# Patient Record
Sex: FEMALE | Race: WHITE | NOT HISPANIC OR LATINO | URBAN - METROPOLITAN AREA
[De-identification: names, ages, dates, MRNs, and addresses within clinical notes are randomized per-mention and may not be internally consistent; named-entity substitution may affect disease eponyms.]

---

## 2024-11-22 ENCOUNTER — EMERGENCY (EMERGENCY)
Facility: HOSPITAL | Age: 29
LOS: 1 days | Discharge: HOME | End: 2024-11-22
Attending: EMERGENCY MEDICINE | Admitting: EMERGENCY MEDICINE
Payer: MEDICAID

## 2024-11-22 VITALS
SYSTOLIC BLOOD PRESSURE: 124 MMHG | TEMPERATURE: 98 F | HEART RATE: 88 BPM | OXYGEN SATURATION: 99 % | RESPIRATION RATE: 19 BRPM | DIASTOLIC BLOOD PRESSURE: 78 MMHG

## 2024-11-22 VITALS
OXYGEN SATURATION: 98 % | SYSTOLIC BLOOD PRESSURE: 123 MMHG | RESPIRATION RATE: 18 BRPM | TEMPERATURE: 99 F | DIASTOLIC BLOOD PRESSURE: 83 MMHG | WEIGHT: 89.95 LBS | HEIGHT: 59 IN | HEART RATE: 92 BPM

## 2024-11-22 LAB
ALBUMIN SERPL ELPH-MCNC: 3.7 G/DL — SIGNIFICANT CHANGE UP (ref 3.4–5)
ALP SERPL-CCNC: 49 U/L — SIGNIFICANT CHANGE UP (ref 40–120)
ALT FLD-CCNC: 15 U/L — SIGNIFICANT CHANGE UP (ref 12–42)
ANION GAP SERPL CALC-SCNC: 8 MMOL/L — LOW (ref 9–16)
AST SERPL-CCNC: 21 U/L — SIGNIFICANT CHANGE UP (ref 15–37)
BASOPHILS # BLD AUTO: 0.05 K/UL — SIGNIFICANT CHANGE UP (ref 0–0.2)
BASOPHILS NFR BLD AUTO: 0.8 % — SIGNIFICANT CHANGE UP (ref 0–2)
BILIRUB SERPL-MCNC: 0.3 MG/DL — SIGNIFICANT CHANGE UP (ref 0.2–1.2)
BUN SERPL-MCNC: 18 MG/DL — SIGNIFICANT CHANGE UP (ref 7–23)
CALCIUM SERPL-MCNC: 8.8 MG/DL — SIGNIFICANT CHANGE UP (ref 8.5–10.5)
CHLORIDE SERPL-SCNC: 106 MMOL/L — SIGNIFICANT CHANGE UP (ref 96–108)
CO2 SERPL-SCNC: 26 MMOL/L — SIGNIFICANT CHANGE UP (ref 22–31)
CREAT SERPL-MCNC: 0.76 MG/DL — SIGNIFICANT CHANGE UP (ref 0.5–1.3)
EGFR: 109 ML/MIN/1.73M2 — SIGNIFICANT CHANGE UP
EOSINOPHIL # BLD AUTO: 0.12 K/UL — SIGNIFICANT CHANGE UP (ref 0–0.5)
EOSINOPHIL NFR BLD AUTO: 1.9 % — SIGNIFICANT CHANGE UP (ref 0–6)
GLUCOSE SERPL-MCNC: 79 MG/DL — SIGNIFICANT CHANGE UP (ref 70–99)
HCG SERPL-ACNC: 2 MIU/ML — SIGNIFICANT CHANGE UP
HCT VFR BLD CALC: 37.6 % — SIGNIFICANT CHANGE UP (ref 34.5–45)
HGB BLD-MCNC: 13.3 G/DL — SIGNIFICANT CHANGE UP (ref 11.5–15.5)
IMM GRANULOCYTES NFR BLD AUTO: 0 % — SIGNIFICANT CHANGE UP (ref 0–0.9)
LYMPHOCYTES # BLD AUTO: 2.16 K/UL — SIGNIFICANT CHANGE UP (ref 1–3.3)
LYMPHOCYTES # BLD AUTO: 33.6 % — SIGNIFICANT CHANGE UP (ref 13–44)
MAGNESIUM SERPL-MCNC: 1.9 MG/DL — SIGNIFICANT CHANGE UP (ref 1.6–2.6)
MCHC RBC-ENTMCNC: 34.2 PG — HIGH (ref 27–34)
MCHC RBC-ENTMCNC: 35.4 G/DL — SIGNIFICANT CHANGE UP (ref 32–36)
MCV RBC AUTO: 96.7 FL — SIGNIFICANT CHANGE UP (ref 80–100)
MONOCYTES # BLD AUTO: 0.47 K/UL — SIGNIFICANT CHANGE UP (ref 0–0.9)
MONOCYTES NFR BLD AUTO: 7.3 % — SIGNIFICANT CHANGE UP (ref 2–14)
NEUTROPHILS # BLD AUTO: 3.62 K/UL — SIGNIFICANT CHANGE UP (ref 1.8–7.4)
NEUTROPHILS NFR BLD AUTO: 56.4 % — SIGNIFICANT CHANGE UP (ref 43–77)
NRBC # BLD: 0 /100 WBCS — SIGNIFICANT CHANGE UP (ref 0–0)
PLATELET # BLD AUTO: 237 K/UL — SIGNIFICANT CHANGE UP (ref 150–400)
POTASSIUM SERPL-MCNC: 3.9 MMOL/L — SIGNIFICANT CHANGE UP (ref 3.5–5.3)
POTASSIUM SERPL-SCNC: 3.9 MMOL/L — SIGNIFICANT CHANGE UP (ref 3.5–5.3)
PROT SERPL-MCNC: 6.7 G/DL — SIGNIFICANT CHANGE UP (ref 6.4–8.2)
RBC # BLD: 3.89 M/UL — SIGNIFICANT CHANGE UP (ref 3.8–5.2)
RBC # FLD: 11.9 % — SIGNIFICANT CHANGE UP (ref 10.3–14.5)
SODIUM SERPL-SCNC: 140 MMOL/L — SIGNIFICANT CHANGE UP (ref 132–145)
TROPONIN I, HIGH SENSITIVITY RESULT: 4.7 NG/L — SIGNIFICANT CHANGE UP
TSH SERPL-MCNC: 2.64 UIU/ML — SIGNIFICANT CHANGE UP (ref 0.36–3.74)
WBC # BLD: 6.42 K/UL — SIGNIFICANT CHANGE UP (ref 3.8–10.5)
WBC # FLD AUTO: 6.42 K/UL — SIGNIFICANT CHANGE UP (ref 3.8–10.5)

## 2024-11-22 PROCEDURE — 99285 EMERGENCY DEPT VISIT HI MDM: CPT

## 2024-11-22 NOTE — ED ADULT TRIAGE NOTE - PAIN: PRESENCE, MLM
NOLANETS:  [facility]  A collaboration between [facility] and [facility]    PATIENT: Nan Palafox  MRN: [id number]  DATE: 12/2/2020      Diagnosis:   1. Malignant carcinoid tumor of bronchus and lung    2. Abnormal thyroid scan    3. History of gastrointestinal stromal tumor (GIST)        Chief Complaint: Carcinoid Tumor      Oncologic History:      Oncologic History Pulmonary carcinoid tumor, right, diagnosed 11/2020    Oncologic Treatment     Pathology 11/25/2020-CT lung biopsy:  Low-grade carcinoid tumor, Ki-67 <2%          Subjective:    Interval History: Ms. Palafox is a 72 y.o. female who is seen as an initial visit for a pulmonary carcinoid tumor.  Her history dates to 11/2020 when she was undergoing a CT for surveillance of a pulmonary nodule that was noted on CTs going back to 2018.  This particular nodule had increased in size to 1.5 cm from 1 cm and a biopsy was recommended.  She underwent a CT-guided biopsy on 11/25/2020 with pathology showing a low-grade carcinoid tumor with Ki-67 < 2%.  She did have complications following her biopsy including a possible seizure and required an ICU stay and rehab.  She has since recovered from her symptoms.  Gallium 68 PET-CT in 12/2020 showed no evidence uptake in the right middle lobe nodule, however, she did have some uptake near the left thyroid.    She is seen today with her daughter.  She states that she generally has been feeling well.  She has a chronic cough for many years but has never had hemoptysis.  She denies any shortness of breath or wheeze.  Her prior neurologic symptoms have completely resolved.  She is without other new complaints.    Past Medical History:   Past Medical History:   Diagnosis Date    Arthritis     Cataract     Cervical spondylosis with radiculopathy 4/11/2016    History of gastrointestinal stromal tumor (GIST) 12/2/2020     Hypertension     Malignant carcinoid tumor of bronchus and lung 12/2/2020    Nuclear sclerosis - Both Eyes 2/8/2013    PNA (pneumonia)     Primary open angle glaucoma (POAG) of both eyes, moderate stage 3/11/2019       Past Surgical HIstory:   Past Surgical History:   Procedure Laterality Date    BREAST BIOPSY Right     excisional bx    BREAST SURGERY Right     Excisional bx    RETINAL DETACHMENT SURGERY      patient states that she had a retinal tear that was repaired in the past at [facility] but she is uncertain of which eye    STOMACH SURGERY      TOTAL KNEE ARTHROPLASTY Right        Family History:   Family History   Problem Relation Age of Onset    Glaucoma Mother     Cataracts Mother     Blindness Mother     Cancer Mother 68        colon, myeloma    Glaucoma Brother     Cataracts Brother     Cancer Brother         esophageal    No Known Problems Father     No Known Problems Sister     No Known Problems Maternal Aunt     No Known Problems Maternal Uncle     No Known Problems Paternal Aunt     No Known Problems Paternal Uncle     No Known Problems Maternal Grandmother     No Known Problems Maternal Grandfather     No Known Problems Paternal Grandmother     No Known Problems Paternal Grandfather     Amblyopia Neg Hx     Macular degeneration Neg Hx     Retinal detachment Neg Hx     Strabismus Neg Hx     Diabetes Neg Hx     Hypertension Neg Hx     Stroke Neg Hx     Thyroid disease Neg Hx     Breast cancer Neg Hx     Ovarian cancer Neg Hx        Social History:  reports that she has never smoked. She has never used smokeless tobacco. She reports current alcohol use of about 3.0 standard drinks of alcohol per week. She reports that she does not use drugs.    Allergies:  Review of patient's allergies indicates:   Allergen Reactions    Iodine Hives       Medications:  Current Outpatient Medications   Medication Sig Dispense Refill    albuterol (VENTOLIN HFA) 90 mcg/actuation inhaler  Inhale 2 puffs into the lungs every 6 (six) hours as needed for Wheezing or Shortness of Breath. Rescue 1 Inhaler 0    amLODIPine (NORVASC) 10 MG tablet Take 1 tablet (10 mg total) by mouth once daily. For blood pressure control. 90 tablet 3    ASCORBATE CALCIUM (VITAMIN C ORAL) Take by mouth.      aspirin (ECOTRIN) 81 MG EC tablet Take 81 mg by mouth once daily.      atorvastatin (LIPITOR) 20 MG tablet Take 1 tablet (20 mg total) by mouth once daily. For cholesterol control 30 tablet 6    dorzolamide-timolol 2-0.5% (COSOPT) 22.3-6.8 mg/mL ophthalmic solution Place 1 drop into both eyes 2 (two) times daily. 10 mL 12    fluticasone propionate (FLONASE) 50 mcg/actuation nasal spray SHAKE LIQUID AND USE 2 SPRAYS(100 MCG) IN EACH NOSTRIL EVERY DAY 48 g 0    hydrOXYzine pamoate (VISTARIL) 25 MG Cap TK ONE C PO Q 8 H PRA      latanoprost 0.005 % ophthalmic solution Place 1 drop into both eyes every evening. 2.5 mL 12    LORazepam (ATIVAN) 1 MG tablet TAKE 1 TABLET BY MOUTH EVERY 8 HOURS AS NEEDED FOR ANXIETY 60 tablet 0    melatonin (MELATIN) Take 5 mg by mouth.      multivitamin-iron-folic acid (CENTRUM ULTRA WOMEN'S)  mg-mcg Tab Take by mouth.      pantoprazole (PROTONIX) 40 MG tablet TK 1 T PO QAM  2    rosuvastatin (CRESTOR) 20 MG tablet TK 1 T PO QD  4    tiZANidine (ZANAFLEX) 4 MG tablet       valsartan-hydrochlorothiazide (DIOVAN-HCT) 320-25 mg per tablet TK 1 T PO QAM  3    vitamin D 1000 units Tab Take 1,000 Units by mouth.      azithromycin (Z-CRISS) 250 MG tablet       fluticasone (FLONASE) 50 mcg/actuation nasal spray 2 sprays by Each Nare route once daily. (Patient not taking: Reported on 11/17/2020) 1 Bottle 12    gabapentin (NEURONTIN) 300 MG capsule       levETIRAcetam (KEPPRA) 500 MG Tab TK 1 T PO BID      methylPREDNISolone (MEDROL DOSEPACK) 4 mg tablet       promethazine-dextromethorphan (PROMETHAZINE-DM) 6.25-15 mg/5 mL Syrp TK 1 TO 2 TEA PO Q 6 H PRF COUGH       No current  "facility-administered medications for this visit.        Review of Systems   Constitutional: Negative for chills, fever and unexpected weight change.   HENT: Negative for congestion, hearing loss and nosebleeds.    Eyes: Negative for visual disturbance.   Respiratory: Negative for cough and shortness of breath.    Cardiovascular: Negative for chest pain and palpitations.   Gastrointestinal: Negative for abdominal pain, blood in stool, constipation, diarrhea, nausea and vomiting.   Genitourinary: Negative for dysuria.   Musculoskeletal: Negative for back pain and gait problem.   Skin: Negative for color change and rash.   Neurological: Negative for dizziness, weakness and headaches.   Hematological: Negative for adenopathy. Does not bruise/bleed easily.   Psychiatric/Behavioral: Negative for confusion.       ECOG Performance Status: 0   Objective:      Vitals:   Vitals:    12/02/20 0810   BP: 130/79   BP Location: Right arm   Patient Position: Sitting   BP Method: Medium (Automatic)   Pulse: 81   Temp: 98.2 °F (36.8 °C)   TempSrc: Oral   SpO2: 99%   Weight: 87.1 kg (192 lb 0.3 oz)   Height: 5' 5" (1.651 m)     BMI: Body mass index is 31.95 kg/m².    Physical Exam  Constitutional:       General: She is not in acute distress.     Appearance: She is well-developed.   HENT:      Head: Normocephalic.      Mouth/Throat:      Pharynx: No oropharyngeal exudate.   Eyes:      General: No scleral icterus.  Neck:      Musculoskeletal: Neck supple.      Thyroid: No thyromegaly.      Trachea: No tracheal deviation.   Cardiovascular:      Rate and Rhythm: Normal rate and regular rhythm.   Pulmonary:      Effort: Pulmonary effort is normal. No respiratory distress.      Breath sounds: Normal breath sounds. No wheezing or rales.   Abdominal:      General: There is no distension.      Palpations: Abdomen is soft. There is no mass.      Tenderness: There is no abdominal tenderness. There is no guarding or rebound.   Musculoskeletal: " Normal range of motion.   Lymphadenopathy:      Cervical: No cervical adenopathy.   Skin:     General: Skin is warm and dry.   Neurological:      Mental Status: She is alert and oriented to person, place, and time.      Cranial Nerves: No cranial nerve deficit.         Laboratory Data:  Office Visit on 11/17/2020   Component Date Value Ref Range Status    POC Rapid COVID 11/17/2020 Negative  Negative Final     Acceptable 11/17/2020 Yes   Final     11/27/2020 TC/kp                                  In addition to the stains previously reported, a neuron specific   enolase stain is received which is positive, further evidence   confirming carcinoid tumor. Control tissue stains appropriately.                                  Addendum Report Verified by Daniel Day MD on 11/27/2020 12:39 PM                                  [facility], [facility] * [address] [place] [address] * [place]   [address]                                                                THE ORIGINAL REPORT BELOW WAS VERIFIED BY Daniel Day MD ON   11/25/2020 12:27 PM                                                                  DIAGNOSIS:  11/25/2020      TC/rkb/puma                                  LUNG, RIGHT UPPER LOBE, BIOPSY:   - LOW GRADE CARCINOID TUMOR.     - SEE COMMENT.                                  Comment: Immunoperoxidase stains are performed for confirmation.   Neuroendocrine origin of this tumor is substantiated with strong   staining for CD56 and synaptophysin, although chromogranin is   negative. Cells are weakly positive for TTF1, helping to confirm a   pulmonary origin of this tumor. Ki-67 proliferative index is less than   2%, confirming a low grade proliferative index. The overall   immunohistochemical features confirm a diagnosis of low grade   carcinoid tumor. Since there can be some morphological overlap between   metastatic breast cancer and neuroendocrine tumors, estrogen receptor,    mammoglobin, and GATA3 stains are performed. All three of these stains   are negative, helping to exclude a metastatic mammary carcinoma.                                      Imaging:   Gallium 68 PET-CT 12/01/2020  COMPARISON:  None     FINDINGS:  Quality of the study: Misregistration artifact within the head and neck.     Focal uptake within the region of the posterior left thyroid lobe with maximum SUV of 7.1.  CT demonstrates a correlating 1.0 cm hypodense nodule in or deep to the left lobe.     1.2 x 1.3 cm right middle lobe pulmonary nodule (series 2, image 118) without significant radiotracer uptake.  6 mm pulmonary nodule within the right middle lobe, below the size threshold for PET.     Physiologic distribution of the tracer is seen within the pituitary, salivary, stomach, liver, pancreas uncinate process, spleen, adrenal glands,  tract, and bowel.     Impression:     Right middle lobe pulmonary nodule without significant somatostatin receptor avidity.  Note is made of prior outside hospital FNA with pathology consistent with low-grade carcinoid tumor.  Further evaluation with FDG PET-CT may be considered.     6 mm right middle pulmonary nodule below the size threshold for PET.  Attention on follow-up.     Incidental somatostatin receptor avid hypodense nodule within the region of the posterior left thyroid lobe.  Differential considerations include thyroid or parathyroid neuroendocrine tumor. Recommend further evaluation with thyroid ultrasound and FNA.     This report was flagged in Epic as abnormal.            Assessment:       1. Malignant carcinoid tumor of bronchus and lung    2. Abnormal thyroid scan    3. History of gastrointestinal stromal tumor (GIST)           Plan:     I have had a long conversation with Mrs. Palafox and her daughter today concerning her disease.  She has a carcinoid tumor of the right lung which was diagnosed last month after a CT-guided biopsy of a pulmonary nodule.   Review of her CT from last month and also her gallium 68 PET-CT demonstrates no evidence metastatic disease.  We have discussed the natural history of carcinoid tumors and have noted that this pulmonary nodule dates back to CTs done in 2018 indicating a slow-growing malignancy.  I have recommended she see thoracic surgery for consideration of surgical resection.  We have also discussed the possibility stereotactic body radiotherapy for this but she wishes to hold off on any radiation.  She understands she will likely not need any chemotherapy or radiation following this but will need to review pathology after surgical resection.  It is noted that she had mild uptake near the left thyroid and have sent her for a thyroid ultrasound and she may need a biopsy.  Additionally, she has a history of a low-grade gastrointestinal stromal tumor in 2011 and underwent a polypectomy from the stomach but she states she was never told about this.  I do think at some point she will need further evaluation by GI and a new EGD.  Finally, I have told her that I am leaving [facility] in February but would ensure a smooth transition to my replacement at that time.  We will regroup prior to this.  Multiple questions were answered and she is agreeable with this plan.      Basim Wong DO, FACP  Hematology & Oncology, [facility]  [address]  [place]  [address]  ph. [phone]; [phone]  fax. [phone]      60 minutes were spent in coordination of patient's care, record review and counseling.  More than 50% of the time was face-to-face.     denies pain/discomfort (Rating = 0)

## 2024-11-22 NOTE — ED ADULT NURSE NOTE - OBJECTIVE STATEMENT
28 y/o F here with eval of a 15 min episode of palpitations. Pt endorses this to be ongoing issue over the last year. No palpitations or CP at present. Daily cigarette smoker. Denies OCP use or recent travel. H/o anxiety and panic attacks. Allergies to iodine and doxycycline.

## 2024-11-22 NOTE — ED PROVIDER NOTE - CARE PROVIDER_API CALL
Manuel Almanzar  Cardiovascular Disease  [address] [place] [address], [address]  [place] [address]  Phone: [phone]  Fax: [phone]  Follow Up Time:

## 2024-11-22 NOTE — ED ADULT NURSE NOTE - NSFALLUNIVINTERV_ED_ALL_ED
Bed/Stretcher in lowest position, wheels locked, appropriate side rails in place/Call bell, personal items and telephone in reach/Instruct patient to call for assistance before getting out of bed/chair/stretcher/Non-slip footwear applied when patient is off stretcher/[place] to call system/Physically safe environment - no spills, clutter or unnecessary equipment/Purposeful proactive rounding/Room/bathroom lighting operational, light cord in reach

## 2024-11-22 NOTE — ED PROVIDER NOTE - OBJECTIVE STATEMENT
The patient is a previously healthy 29-year-old female who presents with a 1 year history of intermittent palpitations.  She states her symptoms got worse today so she went to urgent care.  She had an episode of her heart skipping beats for approximately 15 minutes.  She has had episodes like this in the past which are accompanied by lightheadedness but she denies any episodes of syncope.  It seems the symptoms have been getting worse over the past 5 days.  She has no previous cardiac history.  She denies any chest pain, but she does feel like she has been somewhat short winded lately and gets fatigued more easily than usual.  She smokes about a pack a day but denies any drug use and she does not vape.  She has a history of asthma but she has not been using an inhaler.  She has no family history of heart disease that she is aware of.  She denies any history of hypertension, hyperlipidemia, or diabetes.  Denies excessive caffeine intake or OTC antihistamines.  No new meds.  Does suffer from anxiety in the past.

## 2024-11-22 NOTE — ED ADULT TRIAGE NOTE - CHIEF COMPLAINT QUOTE
Pt sent from  for eval of a 15 min episode of palpitations. Pt endorses this to be ongoing issue over the last year. No palpitations or CP at present. Daily cigarette smoker. Denies OCP use or recent travel. H/o anxiety and panic attacks.

## 2024-11-22 NOTE — ED PROVIDER NOTE - PATIENT PORTAL LINK FT
You can access the FollowMyHealth Patient Portal offered by [facility] by registering at the following website: [email or web address]. By joining [employer]’s FollowMyHealth portal, you will also be able to view your health information using other applications (apps) compatible with our system.

## 2024-11-22 NOTE — ED PROVIDER NOTE - NSFOLLOWUPINSTRUCTIONS_ED_ALL_ED_FT
Palpitations are feelings that your heartbeat is irregular or is faster than normal. It may feel like your heart is fluttering or skipping a beat. Palpitations are usually not a serious problem. They may be caused by many things, including smoking, caffeine, alcohol, stress, and certain medicines or drugs. Most causes of palpitations are not serious. However, some palpitations can be a sign of a serious problem. You may need further tests to rule out serious medical problems.    Follow these instructions at home:      Pay attention to any changes in your condition. Take these actions to help manage your symptoms:        Eating and drinking    Avoid foods and drinks that may cause palpitations. These may include:    Caffeinated coffee, tea, soft drinks, diet pills, and energy drinks.  Chocolate.  Alcohol.        Lifestyle    Take steps to reduce your stress and anxiety. Things that can help you relax include:    Yoga.  Mind-body activities, such as deep breathing, meditation, or using words and images to create positive thoughts (guided imagery).  Physical activity, such as swimming, jogging, or walking. Tell your health care provider if your palpitations increase with activity. If you have chest pain or shortness of breath with activity, do not continue the activity until you are seen by your health care provider.  Biofeedback. This is a method that helps you learn to use your mind to control things in your body, such as your heartbeat.  Do not use drugs, including cocaine or ecstasy. Do not use marijuana.  Get plenty of rest and sleep. Keep a regular bed time.        General instructions    Take over-the-counter and prescription medicines only as told by your health care provider.  Do not use any products that contain nicotine or tobacco, such as cigarettes and e-cigarettes. If you need help quitting, ask your health care provider.  Keep all follow-up visits as told by your health care provider. This is important. These may include visits for further testing if palpitations do not go away or get worse.    Contact a health care provider if you:  Continue to have a fast or irregular heartbeat after 24 hours.  Notice that your palpitations occur more often.    Get help right away if you:  Have chest pain or shortness of breath.  Have a severe headache.  Feel dizzy or you faint.    Summary  Palpitations are feelings that your heartbeat is irregular or is faster than normal. It may feel like your heart is fluttering or skipping a beat.  Palpitations may be caused by many things, including smoking, caffeine, alcohol, stress, certain medicines, and drugs.  Although most causes of palpitations are not serious, some causes can be a sign of a serious medical problem.  Get help right away if you faint or have chest pain, shortness of breath, a severe headache, or dizziness.    *************************************************************************************    A premature ventricular contraction (PVC) is a common kind of irregular heartbeat (arrhythmia). These contractions are extra heartbeats that start in the ventricles of the heart and occur too early in the normal sequence. During the PVC, the heart's normal electrical pathway is not used, so the beat is shorter and less effective. In most cases, these contractions come and go and do not require treatment.    What are the causes?  Common causes of the condition include:    Smoking.  Drinking alcohol.  Certain medicines.  Some illegal drugs.  Stress.  Caffeine.    Certain medical conditions can also cause PVCs:    Heart failure.  Heart attack, or coronary artery disease.  Heart valve problems.  Changes in minerals in the blood (electrolytes).  Low blood oxygen levels or high carbon dioxide levels.    In many cases, the cause of this condition is not known.    What are the signs or symptoms?  The main symptom of this condition is fast or skipped heartbeats (palpitations). Other symptoms include:    Chest pain.  Shortness of breath.  Feeling tired.  Dizziness.  Difficulty exercising.    In some cases, there are no symptoms.    How is this diagnosed?  This condition may be diagnosed based on:    Your medical history.  A physical exam. During the exam, the health care provider will check for irregular heartbeats.  Tests, such as:    An ECG (electrocardiogram) to monitor the electrical activity of your heart.  An ambulatory cardiac monitor. This device records your heartbeats for 24 hours or more.  Stress tests to see how exercise affects your heart rhythm and blood supply.  An echocardiogram. This test uses sound waves (ultrasound) to produce an image of your heart.  An electrophysiology study (EPS). This test checks for electrical problems in your heart.    How is this treated?  Treatment for this condition depends on any underlying conditions, the type of PVCs that you are having, and how much the symptoms are interfering with your daily life.    Possible treatments include:    Avoiding things that cause premature contractions (triggers). These include caffeine and alcohol.  Taking medicines if symptoms are severe or if the extra heartbeats are frequent.  Getting treatment for underlying conditions that cause PVCs.  Having an implantable cardioverter defibrillator (ICD), if you are at risk for a serious arrhythmia. The ICD is a small device that is inserted into your chest to monitor your heartbeat. When it senses an irregular heartbeat, it sends a shock to bring the heartbeat back to normal.  Having a procedure to destroy the portion of the heart tissue that sends out abnormal signals (catheter ablation).    In some cases, no treatment is required.    Follow these instructions at home:      Lifestyle    Do not use any products that contain nicotine or tobacco, such as cigarettes, e-cigarettes, and chewing tobacco. If you need help quitting, ask your health care provider.  Do not use illegal drugs.  Exercise regularly. Ask your health care provider what type of exercise is safe for you.  Try to get at least 7–9 hours of sleep each night, or as much as recommended by your health care provider.  Find healthy ways to manage stress. Avoid stressful situations when possible.        Alcohol use    Do not drink alcohol if:    Your health care provider tells you not to drink.  You are pregnant, may be pregnant, or are planning to become pregnant.  Alcohol triggers your episodes.  If you drink alcohol:    Limit how much you use to:    0–1 drink a day for women.  0–2 drinks a day for men.  Be aware of how much alcohol is in your drink. In the [place] one drink equals one 12 oz bottle of beer (355 mL), one 5 oz glass of wine (148 mL), or one 1½ oz glass of hard liquor (44 mL).        General instructions    Take over-the-counter and prescription medicines only as told by your health care provider.  If caffeine triggers episodes of PVC, do not eat, drink, or use anything with caffeine in it.  Keep all follow-up visits as told by your health care provider. This is important.    Contact a health care provider if you:  Feel palpitations.    Get help right away if you:  Have chest pain.  Have shortness of breath.  Have sweating for no reason.  Have nausea and vomiting.  Become light-headed or you faint.    Summary  A premature ventricular contraction (PVC) is a common kind of irregular heartbeat (arrhythmia).  In most cases, these contractions come and go and do not require treatment.  You may need to wear an ambulatory cardiac monitor. This records your heartbeats for 24 hours or more.  Treatment depends on any underlying conditions, the type of PVCs that you are having, and how much the symptoms are interfering with your daily life.

## 2024-11-22 NOTE — ED PROVIDER NOTE - CLINICAL SUMMARY MEDICAL DECISION MAKING FREE TEXT BOX
Pt presents with intermittent palpitations.  Possibly experiencing PVCs.  EKG with NSR.  Labs unremarkable.  Will refer to cardiology for outpatient Holter monitor.

## 2024-11-26 DIAGNOSIS — R42 DIZZINESS AND GIDDINESS: ICD-10-CM

## 2024-11-26 DIAGNOSIS — R00.2 PALPITATIONS: ICD-10-CM

## 2024-11-26 DIAGNOSIS — F17.210 NICOTINE DEPENDENCE, CIGARETTES, UNCOMPLICATED: ICD-10-CM
